# Patient Record
Sex: FEMALE | Race: WHITE
[De-identification: names, ages, dates, MRNs, and addresses within clinical notes are randomized per-mention and may not be internally consistent; named-entity substitution may affect disease eponyms.]

---

## 2019-04-01 ENCOUNTER — HOSPITAL ENCOUNTER (INPATIENT)
Dept: HOSPITAL 19 - LDRO | Age: 29
LOS: 2 days | Discharge: HOME | End: 2019-04-03
Payer: COMMERCIAL

## 2019-04-01 VITALS — DIASTOLIC BLOOD PRESSURE: 77 MMHG | HEART RATE: 83 BPM | SYSTOLIC BLOOD PRESSURE: 146 MMHG

## 2019-04-01 VITALS — HEART RATE: 105 BPM | SYSTOLIC BLOOD PRESSURE: 133 MMHG | DIASTOLIC BLOOD PRESSURE: 76 MMHG

## 2019-04-01 VITALS — SYSTOLIC BLOOD PRESSURE: 146 MMHG | DIASTOLIC BLOOD PRESSURE: 96 MMHG | HEART RATE: 88 BPM

## 2019-04-01 VITALS — SYSTOLIC BLOOD PRESSURE: 142 MMHG | DIASTOLIC BLOOD PRESSURE: 92 MMHG | HEART RATE: 86 BPM

## 2019-04-01 VITALS — HEART RATE: 97 BPM | SYSTOLIC BLOOD PRESSURE: 136 MMHG | DIASTOLIC BLOOD PRESSURE: 84 MMHG

## 2019-04-01 VITALS — TEMPERATURE: 98 F | DIASTOLIC BLOOD PRESSURE: 88 MMHG | HEART RATE: 114 BPM | SYSTOLIC BLOOD PRESSURE: 174 MMHG

## 2019-04-01 VITALS — HEART RATE: 80 BPM | DIASTOLIC BLOOD PRESSURE: 85 MMHG | SYSTOLIC BLOOD PRESSURE: 142 MMHG

## 2019-04-01 VITALS — TEMPERATURE: 98.4 F | SYSTOLIC BLOOD PRESSURE: 159 MMHG | DIASTOLIC BLOOD PRESSURE: 104 MMHG | HEART RATE: 93 BPM

## 2019-04-01 VITALS — DIASTOLIC BLOOD PRESSURE: 75 MMHG | HEART RATE: 83 BPM | SYSTOLIC BLOOD PRESSURE: 145 MMHG

## 2019-04-01 VITALS — SYSTOLIC BLOOD PRESSURE: 137 MMHG | DIASTOLIC BLOOD PRESSURE: 80 MMHG | HEART RATE: 93 BPM

## 2019-04-01 VITALS — HEART RATE: 123 BPM | SYSTOLIC BLOOD PRESSURE: 137 MMHG | DIASTOLIC BLOOD PRESSURE: 68 MMHG

## 2019-04-01 VITALS — SYSTOLIC BLOOD PRESSURE: 139 MMHG | HEART RATE: 91 BPM | DIASTOLIC BLOOD PRESSURE: 89 MMHG

## 2019-04-01 VITALS — HEART RATE: 83 BPM | DIASTOLIC BLOOD PRESSURE: 86 MMHG | TEMPERATURE: 97.6 F | SYSTOLIC BLOOD PRESSURE: 141 MMHG

## 2019-04-01 VITALS — SYSTOLIC BLOOD PRESSURE: 152 MMHG | HEART RATE: 87 BPM | DIASTOLIC BLOOD PRESSURE: 85 MMHG

## 2019-04-01 VITALS — SYSTOLIC BLOOD PRESSURE: 133 MMHG | HEART RATE: 89 BPM | DIASTOLIC BLOOD PRESSURE: 85 MMHG

## 2019-04-01 VITALS — SYSTOLIC BLOOD PRESSURE: 139 MMHG | HEART RATE: 102 BPM | DIASTOLIC BLOOD PRESSURE: 89 MMHG

## 2019-04-01 VITALS — HEIGHT: 65 IN | WEIGHT: 170.42 LBS | BODY MASS INDEX: 28.39 KG/M2

## 2019-04-01 VITALS — SYSTOLIC BLOOD PRESSURE: 140 MMHG | HEART RATE: 99 BPM | DIASTOLIC BLOOD PRESSURE: 90 MMHG

## 2019-04-01 VITALS — HEART RATE: 95 BPM | DIASTOLIC BLOOD PRESSURE: 90 MMHG | SYSTOLIC BLOOD PRESSURE: 146 MMHG

## 2019-04-01 VITALS — HEART RATE: 77 BPM | DIASTOLIC BLOOD PRESSURE: 88 MMHG | SYSTOLIC BLOOD PRESSURE: 151 MMHG

## 2019-04-01 DIAGNOSIS — O14.93: Primary | ICD-10-CM

## 2019-04-01 DIAGNOSIS — Z3A.36: ICD-10-CM

## 2019-04-01 DIAGNOSIS — D62: ICD-10-CM

## 2019-04-01 DIAGNOSIS — O43.193: ICD-10-CM

## 2019-04-01 DIAGNOSIS — O99.343: ICD-10-CM

## 2019-04-01 DIAGNOSIS — F41.9: ICD-10-CM

## 2019-04-01 LAB
ALBUMIN SERPL-MCNC: 3.3 GM/DL (ref 3.5–5)
ALP SERPL-CCNC: 243 U/L (ref 50–136)
ALT SERPL-CCNC: 56 U/L (ref 9–52)
ANION GAP SERPL CALC-SCNC: 8 MMOL/L (ref 7–16)
AST SERPL-CCNC: 63 U/L (ref 15–37)
BASOPHILS # BLD: 0 10*3/UL (ref 0–0.2)
BASOPHILS NFR BLD AUTO: 0.4 % (ref 0–2)
BILIRUB SERPL-MCNC: 0.3 MG/DL (ref 0–1)
BUN SERPL-MCNC: 16 MG/DL (ref 7–17)
CALCIUM SERPL-MCNC: 9.1 MG/DL (ref 8.4–10.2)
CHLORIDE SERPL-SCNC: 105 MMOL/L (ref 98–107)
CO2 SERPL-SCNC: 20 MMOL/L (ref 22–30)
CREAT SERPL-SCNC: 0.9 UMOL/L (ref 0.52–1.25)
EOSINOPHIL # BLD: 0 10*3/UL (ref 0–0.7)
EOSINOPHIL NFR BLD: 0.4 % (ref 0–4)
ERYTHROCYTE [DISTWIDTH] IN BLOOD BY AUTOMATED COUNT: 12.4 % (ref 11.5–14.5)
GLUCOSE SERPL-MCNC: 83 MG/DL (ref 74–106)
GRANULOCYTES # BLD AUTO: 71 % (ref 42.2–75.2)
HCT VFR BLD AUTO: 33.1 % (ref 37–47)
HGB BLD-MCNC: 11.4 G/DL (ref 12.5–16)
LYMPHOCYTES # BLD: 1.7 10*3/UL (ref 1.2–3.4)
LYMPHOCYTES NFR BLD: 21.1 % (ref 20–51)
MCH RBC QN AUTO: 30 PG (ref 27–31)
MCHC RBC AUTO-ENTMCNC: 34 G/DL (ref 33–37)
MCV RBC AUTO: 86 FL (ref 80–100)
MONOCYTES # BLD: 0.5 10*3/UL (ref 0.1–0.6)
MONOCYTES NFR BLD AUTO: 6.1 % (ref 1.7–9.3)
NEUTROPHILS # BLD: 5.6 10*3/UL (ref 1.4–6.5)
PH UR STRIP.AUTO: 6 [PH] (ref 5–8)
PLATELET # BLD AUTO: 255 K/MM3 (ref 130–400)
PMV BLD AUTO: 11.7 FL (ref 7.4–10.4)
POTASSIUM SERPL-SCNC: 4 MMOL/L (ref 3.4–5)
PROT SERPL-MCNC: 6.5 GM/DL (ref 6.4–8.2)
RBC # BLD AUTO: 3.87 M/MM3 (ref 4.1–5.3)
RBC # UR: (no result) /HPF
SODIUM SERPL-SCNC: 132 MMOL/L (ref 137–145)
SP GR UR STRIP.AUTO: 1.01 (ref 1–1.03)
SQUAMOUS # URNS: (no result) /HPF
UA DIPSTICK PNL UR STRIP.AUTO: (no result)
URN COLLECT METHOD CLASS: (no result)
WBC # UR: (no result) /HPF

## 2019-04-01 PROCEDURE — 0KQM0ZZ REPAIR PERINEUM MUSCLE, OPEN APPROACH: ICD-10-PCS | Performed by: OBSTETRICS & GYNECOLOGY

## 2019-04-01 NOTE — NUR
Pt able to hold legs up bilaterally. Pt assisted to edge of bed. Epidural
catheter removed without difficulties. Pt denies lightheadedness or dizziness.
Pt ambulatory to bathroom, one person assist. Pt unable to void. Educated pt
to drink plenty of fluids and will try again to void in one hour. Pericare
provided. Mesh panties, pad, ice pack and tucks applied. Pt ambulatory to room
215. Pt and  oriented to postpartum room. Call light within reach. Will
continue to monitor.

## 2019-04-01 NOTE — NUR
Patient ambulatory to LR 4, changed into gown, FHR/TOCO monitors placed and
explained. Patient states her water broke around 1300 and has been fidel
since about 0830.
SVE-Per Tiffanie RN-4/90/0 and clear fluid noted.
Assessment completed and consents gone over and signed.
1400: IV placed in left forearm per Tiffanie RN, labs drawn and to lab, and
LR infusing. Patient requesting epidural.
Rachel HORTON at nurses station and notified.
1415: FHR tracing maternal heart rate due to maternal position-sitting
forward.
1420: FHR continues to trace maternal heart rate.
1428: Patient sits up on edge of bed for epidural placement. Rachel CRNA at
bedside for procedure.
1445: Test dose and patient tolerates well.
1452: Patient repositioned and becoming comfortable with epidural.
1525: Patient comfortable with epidural and dukes catheter placed/patient
tolerates well. SVE-8-9/100/0 and Dr. Parker called and notifed.
Patient updated on plan of care and will continue to monitor.

## 2019-04-01 NOTE — NUR
Dr. Parker at bedside and SVE per physician 10/+1.
Orders to set up to start pushing.
1650: Pisano catheter removed and patient tolerates well.
1653: Patient begins to push with Dr. Parker with each contraction.
1712: Bed taken apart and patient set up for vaginal delivery.
1716: Spontaneous vaginal delivery of head followed by body, Infant to mothers
abdomen, Sylvia RN assumes care of infant. Cord clamped by Dr. Parker and
cut by FOB. Cord blood obtained.
1620: Spontaneous delivery of placenta and accessory lobe and pitocin bolus
started
per protocol.
Fundal massage done, bleeding WNL and firm
Dr. Parker begins to repair laceration and red robins patient at this time.
1730: Patient repositioned, Ice pack to tiarra, and fundal massage done-bleeding
moderate with apple size blood clot expressed. Dr. Parker notifed and observes
bleeding at this time, no new orders.
Plan of care discussed and will continue to monitor.

## 2019-04-02 VITALS — SYSTOLIC BLOOD PRESSURE: 138 MMHG | HEART RATE: 87 BPM | DIASTOLIC BLOOD PRESSURE: 89 MMHG | TEMPERATURE: 98.3 F

## 2019-04-02 VITALS — TEMPERATURE: 98.5 F | HEART RATE: 78 BPM | SYSTOLIC BLOOD PRESSURE: 154 MMHG | DIASTOLIC BLOOD PRESSURE: 97 MMHG

## 2019-04-02 VITALS — SYSTOLIC BLOOD PRESSURE: 136 MMHG | HEART RATE: 83 BPM | DIASTOLIC BLOOD PRESSURE: 91 MMHG

## 2019-04-02 VITALS — HEART RATE: 84 BPM | DIASTOLIC BLOOD PRESSURE: 81 MMHG | SYSTOLIC BLOOD PRESSURE: 157 MMHG | TEMPERATURE: 97.7 F

## 2019-04-02 VITALS — HEART RATE: 98 BPM | DIASTOLIC BLOOD PRESSURE: 97 MMHG | TEMPERATURE: 98.7 F | SYSTOLIC BLOOD PRESSURE: 145 MMHG

## 2019-04-02 VITALS — SYSTOLIC BLOOD PRESSURE: 146 MMHG | DIASTOLIC BLOOD PRESSURE: 89 MMHG | HEART RATE: 89 BPM

## 2019-04-02 VITALS — SYSTOLIC BLOOD PRESSURE: 148 MMHG | HEART RATE: 84 BPM | DIASTOLIC BLOOD PRESSURE: 92 MMHG

## 2019-04-02 VITALS — DIASTOLIC BLOOD PRESSURE: 84 MMHG | HEART RATE: 83 BPM | TEMPERATURE: 98.2 F | SYSTOLIC BLOOD PRESSURE: 134 MMHG

## 2019-04-02 NOTE — NUR
0050- DR GRESHAM AT [facility] TO SEE LABOR PT AND IS UPDATED ON PT'S MOST RECENT
BLOOD PRESSURE AND PLAN TO RECHECK AT 0200. ORDER FOR PROCARDIA XL 30 MG PO
DAILY TO START TONIGHT IF BLOOD PRESSURE IS >140 OR >90 DIASTOLIC.
0215- DR GRESHAM UPDATED ON CURRENT BLOOD PRESSURE. STATES TO START PROCARDIA AS
ORDERED.
0220- PT UPDATED ON PLAN OF CARE FOR NEW MEDICATION. QUESTIONS ANSWERED.

## 2019-04-03 VITALS — SYSTOLIC BLOOD PRESSURE: 138 MMHG | DIASTOLIC BLOOD PRESSURE: 78 MMHG | HEART RATE: 72 BPM | TEMPERATURE: 98.1 F

## 2019-04-03 LAB
ALBUMIN SERPL-MCNC: 2.9 GM/DL (ref 3.5–5)
ALP SERPL-CCNC: 183 U/L (ref 50–136)
ALT SERPL-CCNC: 55 U/L (ref 9–52)
ANION GAP SERPL CALC-SCNC: 6 MMOL/L (ref 7–16)
AST SERPL-CCNC: 57 U/L (ref 15–37)
BILIRUB SERPL-MCNC: 0.1 MG/DL (ref 0–1)
BUN SERPL-MCNC: 13 MG/DL (ref 7–17)
CALCIUM SERPL-MCNC: 8.2 MG/DL (ref 8.4–10.2)
CHLORIDE SERPL-SCNC: 106 MMOL/L (ref 98–107)
CO2 SERPL-SCNC: 23 MMOL/L (ref 22–30)
CREAT SERPL-SCNC: 0.83 UMOL/L (ref 0.52–1.25)
ERYTHROCYTE [DISTWIDTH] IN BLOOD BY AUTOMATED COUNT: 13.2 % (ref 11.5–14.5)
GLUCOSE SERPL-MCNC: 78 MG/DL (ref 74–106)
HCT VFR BLD AUTO: 26.8 % (ref 37–47)
HGB BLD-MCNC: 8.8 G/DL (ref 12.5–16)
MCH RBC QN AUTO: 29 PG (ref 27–31)
MCHC RBC AUTO-ENTMCNC: 33 G/DL (ref 33–37)
MCV RBC AUTO: 88 FL (ref 80–100)
PLATELET # BLD AUTO: 250 K/MM3 (ref 130–400)
PMV BLD AUTO: 10.6 FL (ref 7.4–10.4)
POTASSIUM SERPL-SCNC: 4.2 MMOL/L (ref 3.4–5)
PROT SERPL-MCNC: 5.7 GM/DL (ref 6.4–8.2)
RBC # BLD AUTO: 3.05 M/MM3 (ref 4.1–5.3)
SODIUM SERPL-SCNC: 135 MMOL/L (ref 137–145)

## 2019-04-03 NOTE — NUR
Initial visit attempt; Family resting,  left card of congratulations
for the birth of their son and information regarding the availability of
spiritual care at our hospital.

## 2020-11-08 ENCOUNTER — HOSPITAL ENCOUNTER (INPATIENT)
Dept: HOSPITAL 19 - LDRO | Age: 30
LOS: 2 days | Discharge: HOME | End: 2020-11-10
Payer: COMMERCIAL

## 2020-11-08 VITALS — SYSTOLIC BLOOD PRESSURE: 162 MMHG | DIASTOLIC BLOOD PRESSURE: 71 MMHG | HEART RATE: 80 BPM

## 2020-11-08 VITALS — SYSTOLIC BLOOD PRESSURE: 122 MMHG | DIASTOLIC BLOOD PRESSURE: 62 MMHG | HEART RATE: 90 BPM

## 2020-11-08 VITALS — DIASTOLIC BLOOD PRESSURE: 77 MMHG | SYSTOLIC BLOOD PRESSURE: 128 MMHG | HEART RATE: 93 BPM | TEMPERATURE: 98 F

## 2020-11-08 VITALS — HEART RATE: 76 BPM | SYSTOLIC BLOOD PRESSURE: 108 MMHG | DIASTOLIC BLOOD PRESSURE: 73 MMHG

## 2020-11-08 VITALS — HEART RATE: 68 BPM | SYSTOLIC BLOOD PRESSURE: 123 MMHG | DIASTOLIC BLOOD PRESSURE: 81 MMHG | TEMPERATURE: 98 F

## 2020-11-08 VITALS — DIASTOLIC BLOOD PRESSURE: 59 MMHG | HEART RATE: 70 BPM | SYSTOLIC BLOOD PRESSURE: 116 MMHG

## 2020-11-08 VITALS — SYSTOLIC BLOOD PRESSURE: 112 MMHG | DIASTOLIC BLOOD PRESSURE: 64 MMHG | HEART RATE: 95 BPM

## 2020-11-08 VITALS — HEIGHT: 65 IN | WEIGHT: 174.39 LBS | BODY MASS INDEX: 29.05 KG/M2

## 2020-11-08 VITALS — HEART RATE: 83 BPM | SYSTOLIC BLOOD PRESSURE: 131 MMHG | DIASTOLIC BLOOD PRESSURE: 81 MMHG

## 2020-11-08 VITALS — DIASTOLIC BLOOD PRESSURE: 74 MMHG | HEART RATE: 86 BPM | SYSTOLIC BLOOD PRESSURE: 139 MMHG

## 2020-11-08 VITALS — SYSTOLIC BLOOD PRESSURE: 128 MMHG | HEART RATE: 82 BPM | DIASTOLIC BLOOD PRESSURE: 80 MMHG | TEMPERATURE: 98.2 F

## 2020-11-08 VITALS — SYSTOLIC BLOOD PRESSURE: 120 MMHG | HEART RATE: 88 BPM | DIASTOLIC BLOOD PRESSURE: 67 MMHG

## 2020-11-08 VITALS — DIASTOLIC BLOOD PRESSURE: 66 MMHG | HEART RATE: 78 BPM | SYSTOLIC BLOOD PRESSURE: 116 MMHG

## 2020-11-08 VITALS — HEART RATE: 68 BPM | SYSTOLIC BLOOD PRESSURE: 106 MMHG | DIASTOLIC BLOOD PRESSURE: 62 MMHG

## 2020-11-08 VITALS — SYSTOLIC BLOOD PRESSURE: 126 MMHG | DIASTOLIC BLOOD PRESSURE: 69 MMHG | HEART RATE: 80 BPM

## 2020-11-08 VITALS — HEART RATE: 82 BPM | SYSTOLIC BLOOD PRESSURE: 117 MMHG | DIASTOLIC BLOOD PRESSURE: 61 MMHG

## 2020-11-08 VITALS — DIASTOLIC BLOOD PRESSURE: 63 MMHG | HEART RATE: 76 BPM | SYSTOLIC BLOOD PRESSURE: 126 MMHG

## 2020-11-08 VITALS — HEART RATE: 80 BPM | DIASTOLIC BLOOD PRESSURE: 62 MMHG | SYSTOLIC BLOOD PRESSURE: 118 MMHG

## 2020-11-08 VITALS — HEART RATE: 90 BPM | DIASTOLIC BLOOD PRESSURE: 67 MMHG | SYSTOLIC BLOOD PRESSURE: 136 MMHG

## 2020-11-08 VITALS — HEART RATE: 93 BPM | SYSTOLIC BLOOD PRESSURE: 146 MMHG | DIASTOLIC BLOOD PRESSURE: 76 MMHG

## 2020-11-08 VITALS — TEMPERATURE: 98.2 F | SYSTOLIC BLOOD PRESSURE: 144 MMHG | HEART RATE: 104 BPM | DIASTOLIC BLOOD PRESSURE: 88 MMHG

## 2020-11-08 VITALS — DIASTOLIC BLOOD PRESSURE: 64 MMHG | HEART RATE: 93 BPM | SYSTOLIC BLOOD PRESSURE: 108 MMHG

## 2020-11-08 VITALS — DIASTOLIC BLOOD PRESSURE: 62 MMHG | SYSTOLIC BLOOD PRESSURE: 109 MMHG | HEART RATE: 78 BPM

## 2020-11-08 DIAGNOSIS — F41.9: ICD-10-CM

## 2020-11-08 DIAGNOSIS — Z3A.39: ICD-10-CM

## 2020-11-08 LAB
BASOPHILS # BLD: 0 10*3/UL (ref 0–0.2)
BASOPHILS NFR BLD AUTO: 0.4 % (ref 0–2)
EOSINOPHIL # BLD: 0.1 10*3/UL (ref 0–0.7)
EOSINOPHIL NFR BLD: 0.9 % (ref 0–4)
ERYTHROCYTE [DISTWIDTH] IN BLOOD BY AUTOMATED COUNT: 12.5 % (ref 11.5–14.5)
GRANULOCYTES # BLD AUTO: 65 % (ref 42.2–75.2)
HCT VFR BLD AUTO: 37.3 % (ref 37–47)
HGB BLD-MCNC: 13 G/DL (ref 12.5–16)
LYMPHOCYTES # BLD: 2.3 10*3/UL (ref 1.2–3.4)
LYMPHOCYTES NFR BLD: 24.7 % (ref 20–51)
MCH RBC QN AUTO: 31 PG (ref 27–31)
MCHC RBC AUTO-ENTMCNC: 35 G/DL (ref 33–37)
MCV RBC AUTO: 88 FL (ref 80–100)
MONOCYTES # BLD: 0.7 10*3/UL (ref 0.1–0.6)
MONOCYTES NFR BLD AUTO: 8 % (ref 1.7–9.3)
NEUTROPHILS # BLD: 6 10*3/UL (ref 1.4–6.5)
PLATELET # BLD AUTO: 234 K/MM3 (ref 130–400)
PMV BLD AUTO: 11.2 FL (ref 7.4–10.4)
RBC # BLD AUTO: 4.24 M/MM3 (ref 4.1–5.3)

## 2020-11-08 PROCEDURE — 0KQM0ZZ REPAIR PERINEUM MUSCLE, OPEN APPROACH: ICD-10-PCS | Performed by: OBSTETRICS & GYNECOLOGY

## 2020-11-08 NOTE — NUR
1025- Dr Mayo at bedside. SVE, complete. Pt and room prepped for delivery. Dr Mayo remains at bedside. Elizabeth, nursery RN to bedside.
 
1033- Pt begins pushing with UC.
 
1034-  of viable female inant. Intant to mother's chest, tended to by
nursery.
 
1036- Cord clamped and cut. Cord blood obtained.
 
1046- Spontaneous delivery of placenta. Pitocin started at 333ml/hr. Fundus
massaged to firm by MD. Repair completed. Pericare completed. Ice pack to
perieum. Pt repositioned to high fowlers. Pt in stable condition.

## 2020-11-08 NOTE — NUR
0805- Pt arrives on unit via wheelchair with complaints of UCs since 0630 this
morning. Pt denies VB, LOF. Pt into bathroom to change into gown.
 
0808- Pt into bed, EFM and TOCO on and tracing. O2 sat on and tracing maternal
HR. SVE 6-7/100/+1, intact. Pt very uncomfortable with UCs.
 
0830- IV start without difficutly, labs obtained.

## 2020-11-08 NOTE — NUR
0857- Alfonso CRNA at bedside for epidural placement. Pt assisted to sitting
on side of bed. FHR not tracing well due to maternal position, O2 sat monitor
on and tracing maternal HR.
 
0902- Single shot, see anesthesia record.
 
0905- Pt assisted to semi-fowlers with WL. EFM and TOCO tracing. Pt tolerated
well.

## 2020-11-08 NOTE — NUR
1315- Pt ambulated to bathroom independently with RN standby assist. Unable to
void. Pt states she has no urge to push and feels like butt is still numb.
Pericare compeleted and explained. Pt encouraged to drink water and try to
void in an hour or less. Pt verbalizes understanding.  Pt, , and baby
to PP room. Oriented, Call light within reach.

## 2020-11-09 VITALS — SYSTOLIC BLOOD PRESSURE: 118 MMHG | HEART RATE: 73 BPM | TEMPERATURE: 98.1 F | DIASTOLIC BLOOD PRESSURE: 73 MMHG

## 2020-11-09 VITALS — DIASTOLIC BLOOD PRESSURE: 75 MMHG | TEMPERATURE: 98.2 F | SYSTOLIC BLOOD PRESSURE: 120 MMHG | HEART RATE: 82 BPM

## 2020-11-09 VITALS — DIASTOLIC BLOOD PRESSURE: 74 MMHG | SYSTOLIC BLOOD PRESSURE: 128 MMHG | TEMPERATURE: 98 F | HEART RATE: 79 BPM

## 2020-11-09 VITALS — SYSTOLIC BLOOD PRESSURE: 116 MMHG | HEART RATE: 73 BPM | DIASTOLIC BLOOD PRESSURE: 71 MMHG | TEMPERATURE: 97.9 F

## 2020-11-09 NOTE — NUR
Initial visit; Parents thanked  for offering congratulations and God's
blessings to their family for the birth of their daughter.  thanked
them for choosing [facility].

## 2020-11-10 VITALS — DIASTOLIC BLOOD PRESSURE: 77 MMHG | TEMPERATURE: 98.3 F | SYSTOLIC BLOOD PRESSURE: 110 MMHG | HEART RATE: 83 BPM

## 2020-11-10 VITALS — SYSTOLIC BLOOD PRESSURE: 124 MMHG | DIASTOLIC BLOOD PRESSURE: 71 MMHG | HEART RATE: 73 BPM | TEMPERATURE: 98.2 F

## 2023-05-12 ENCOUNTER — HOSPITAL ENCOUNTER (INPATIENT)
Dept: HOSPITAL 19 - LDR | Age: 33
LOS: 1 days | Discharge: HOME | End: 2023-05-13
Payer: COMMERCIAL

## 2023-05-12 VITALS — SYSTOLIC BLOOD PRESSURE: 138 MMHG | HEART RATE: 83 BPM | DIASTOLIC BLOOD PRESSURE: 81 MMHG

## 2023-05-12 VITALS — DIASTOLIC BLOOD PRESSURE: 75 MMHG | SYSTOLIC BLOOD PRESSURE: 144 MMHG | HEART RATE: 102 BPM

## 2023-05-12 VITALS — HEART RATE: 91 BPM | DIASTOLIC BLOOD PRESSURE: 62 MMHG | SYSTOLIC BLOOD PRESSURE: 139 MMHG

## 2023-05-12 VITALS — SYSTOLIC BLOOD PRESSURE: 133 MMHG | HEART RATE: 91 BPM | DIASTOLIC BLOOD PRESSURE: 81 MMHG

## 2023-05-12 VITALS — HEART RATE: 88 BPM | SYSTOLIC BLOOD PRESSURE: 148 MMHG | DIASTOLIC BLOOD PRESSURE: 91 MMHG

## 2023-05-12 VITALS — HEART RATE: 87 BPM | SYSTOLIC BLOOD PRESSURE: 123 MMHG | DIASTOLIC BLOOD PRESSURE: 74 MMHG

## 2023-05-12 VITALS — HEART RATE: 83 BPM | DIASTOLIC BLOOD PRESSURE: 80 MMHG | SYSTOLIC BLOOD PRESSURE: 128 MMHG

## 2023-05-12 VITALS — BODY MASS INDEX: 29.38 KG/M2 | HEIGHT: 65 IN | WEIGHT: 176.37 LBS

## 2023-05-12 VITALS — HEART RATE: 82 BPM | DIASTOLIC BLOOD PRESSURE: 66 MMHG | SYSTOLIC BLOOD PRESSURE: 124 MMHG

## 2023-05-12 VITALS — SYSTOLIC BLOOD PRESSURE: 121 MMHG | DIASTOLIC BLOOD PRESSURE: 73 MMHG | HEART RATE: 90 BPM | TEMPERATURE: 97.9 F

## 2023-05-12 VITALS — SYSTOLIC BLOOD PRESSURE: 132 MMHG | HEART RATE: 81 BPM | DIASTOLIC BLOOD PRESSURE: 66 MMHG

## 2023-05-12 VITALS — HEART RATE: 87 BPM | SYSTOLIC BLOOD PRESSURE: 137 MMHG | DIASTOLIC BLOOD PRESSURE: 77 MMHG

## 2023-05-12 VITALS — DIASTOLIC BLOOD PRESSURE: 76 MMHG | HEART RATE: 83 BPM | SYSTOLIC BLOOD PRESSURE: 132 MMHG

## 2023-05-12 VITALS — HEART RATE: 87 BPM | DIASTOLIC BLOOD PRESSURE: 79 MMHG | SYSTOLIC BLOOD PRESSURE: 140 MMHG

## 2023-05-12 VITALS — HEART RATE: 88 BPM | DIASTOLIC BLOOD PRESSURE: 82 MMHG | SYSTOLIC BLOOD PRESSURE: 134 MMHG | TEMPERATURE: 98.1 F

## 2023-05-12 VITALS — DIASTOLIC BLOOD PRESSURE: 65 MMHG | TEMPERATURE: 97.4 F | SYSTOLIC BLOOD PRESSURE: 138 MMHG | HEART RATE: 83 BPM

## 2023-05-12 VITALS — HEART RATE: 88 BPM | SYSTOLIC BLOOD PRESSURE: 140 MMHG | DIASTOLIC BLOOD PRESSURE: 83 MMHG

## 2023-05-12 VITALS — SYSTOLIC BLOOD PRESSURE: 169 MMHG | HEART RATE: 88 BPM | DIASTOLIC BLOOD PRESSURE: 70 MMHG

## 2023-05-12 VITALS — DIASTOLIC BLOOD PRESSURE: 80 MMHG | HEART RATE: 84 BPM | SYSTOLIC BLOOD PRESSURE: 129 MMHG

## 2023-05-12 VITALS — DIASTOLIC BLOOD PRESSURE: 86 MMHG | HEART RATE: 93 BPM | SYSTOLIC BLOOD PRESSURE: 149 MMHG

## 2023-05-12 VITALS — HEART RATE: 82 BPM | DIASTOLIC BLOOD PRESSURE: 70 MMHG | SYSTOLIC BLOOD PRESSURE: 141 MMHG

## 2023-05-12 VITALS — HEART RATE: 82 BPM | SYSTOLIC BLOOD PRESSURE: 124 MMHG | DIASTOLIC BLOOD PRESSURE: 69 MMHG

## 2023-05-12 VITALS — SYSTOLIC BLOOD PRESSURE: 129 MMHG | HEART RATE: 82 BPM | DIASTOLIC BLOOD PRESSURE: 72 MMHG

## 2023-05-12 DIAGNOSIS — Z3A.38: ICD-10-CM

## 2023-05-12 DIAGNOSIS — F41.9: ICD-10-CM

## 2023-05-12 LAB
BASOPHILS # BLD: 0.1 K/MM3 (ref 0–0.2)
BASOPHILS NFR BLD AUTO: 0.7 % (ref 0–2)
EOSINOPHIL # BLD: 0.1 K/MM3 (ref 0–0.7)
EOSINOPHIL NFR BLD: 0.8 % (ref 0–4)
ERYTHROCYTE [DISTWIDTH] IN BLOOD BY AUTOMATED COUNT: 12.2 % (ref 11.5–14.5)
GRANULOCYTES # BLD AUTO: 70.1 % (ref 42.2–75.2)
HCT VFR BLD AUTO: 36.2 % (ref 37–47)
HGB BLD-MCNC: 13 G/DL (ref 12.5–16)
LYMPHOCYTES # BLD: 1.5 K/MM3 (ref 1.2–3.4)
LYMPHOCYTES NFR BLD: 20.5 % (ref 20–51)
MCH RBC QN AUTO: 32 PG (ref 27–31)
MCHC RBC AUTO-ENTMCNC: 36 G/DL (ref 33–37)
MCV RBC AUTO: 89 FL (ref 80–100)
MONOCYTES # BLD: 0.5 K/MM3 (ref 0.1–0.6)
MONOCYTES NFR BLD AUTO: 7 % (ref 1.7–9.3)
NEUTROPHILS # BLD: 5.3 K/MM3 (ref 1.4–6.5)
PLATELET # BLD AUTO: 258 K/MM3 (ref 130–400)
PMV BLD AUTO: 11.3 FL (ref 7.4–10.4)
RBC # BLD AUTO: 4.07 M/MM3 (ref 4.1–5.3)

## 2023-05-12 PROCEDURE — 10D17Z9 MANUAL EXTRACTION OF PRODUCTS OF CONCEPTION, RETAINED, VIA NATURAL OR ARTIFICIAL OPENING: ICD-10-PCS | Performed by: OBSTETRICS & GYNECOLOGY

## 2023-05-12 PROCEDURE — 3E033VJ INTRODUCTION OF OTHER HORMONE INTO PERIPHERAL VEIN, PERCUTANEOUS APPROACH: ICD-10-PCS | Performed by: OBSTETRICS & GYNECOLOGY

## 2023-05-12 PROCEDURE — 0KQM0ZZ REPAIR PERINEUM MUSCLE, OPEN APPROACH: ICD-10-PCS | Performed by: OBSTETRICS & GYNECOLOGY

## 2023-05-12 NOTE — NUR
Pt able to ambulate to bathroom independently. Pt able to void 100 mL blood
tinged urine. Pericare explained and provided. Mesh panties, peripad, and ice
pack applied. Clean gown on. Pt transferred to room 208 in stable condition
with belongings.

## 2023-05-12 NOTE — NUR
1645 SVE 8/90/+1.
 
1651 DR GRESHAM CALLED FOR DELIVERY
 
1700 DR GRESHAM IN ROOM ASSESSING PT.
 
1710 SVE 10/100/+1.
 
1712 SPONTANEOUS VAGINAL DELIVERY OF VIABLE MALE INFANT. INFANT BULB SUCTIONED
AND STIMULATED. INFANT ON MOTHER'S ABDOMEN. CORD CLAMPED BY DR GRESHAM AND CUT
BY FOB. SAUL YOO RN TAKES OVER CARE OF INFANT.
 
PLACENTA NOT DELIVERY SPONTANEOUSLY. DR GRESHAM VERBALLY ORDERS FENATYNL FOR
PAIN CONTROL FOR MANUAL EXTRACTION OF PLACENTA.
 
1737 FENATYNL GIVEN.
 
1745 MANUAL EXTRACTION OF PLACENTA. FUNDAL MASSAGE DONE. PITOCIN BOLUS
STARTED PER PROTOCOL. PT REPOSITIONED. PERICARE DONE. CLEAN PAD PLACED. WARM
BLANKET GIVEN. PLAN OF CARE DISCUSSED. PT VERBALIZES UNDERSTANDING.

## 2023-05-12 NOTE — NUR
DR GOODPASTURE IN ROOM DISCUSSING PLAN OF CARE WITH PT AND THAT SHE WILL ONLY
BE AVAILABLE TILL 1445. PT VERBALIZES UNDERSTANDING. DR GRESHAM TAKING OVER FOR
DR GOODPASTURE AT 1445.

## 2023-05-12 NOTE — NUR
PT AMBULATORY TO LR4. SPOUSE IN ADMISSIONS STILL. PT IS SENT OVER FROM
OFFICE/CLINIC FOR POSITIVE NITRAZINE. DR GOODPASTURE SENDS OVER FOR INDUCTION
DUE TO MOST LIKELY A HIGH LEAK FOR THE LAST COUPLE DAYS. PT WEARING OWN LABOR
GOWN. FHR MONITOR/TOCO APPLIED. PT DENIES VAGINAL BLEEDING, REGULAR
CONTRACTIONS OR DECREASED FETAL MOVEMENT.
 
SVE DONE IN OFFICE RIGHT BEFORE PT WAS SENT OVER FOR INDUCTION. SVE WAS
4/50/-2.

## 2023-05-12 NOTE — NUR
[id number] THIS RN IN ROOM ADJUSTING FHR MONITOR. FETAL MOVEMENT AUDIBLE. PT
STILL ON BIRTHING BALL. SECOND STRAP APPLIED TO FHR MONITOR.

## 2023-05-13 VITALS — DIASTOLIC BLOOD PRESSURE: 73 MMHG | SYSTOLIC BLOOD PRESSURE: 129 MMHG | TEMPERATURE: 97.5 F | HEART RATE: 87 BPM

## 2023-05-13 VITALS — TEMPERATURE: 98.1 F | DIASTOLIC BLOOD PRESSURE: 81 MMHG | SYSTOLIC BLOOD PRESSURE: 129 MMHG | HEART RATE: 80 BPM

## 2023-05-13 VITALS — HEART RATE: 84 BPM | TEMPERATURE: 98.4 F | SYSTOLIC BLOOD PRESSURE: 127 MMHG | DIASTOLIC BLOOD PRESSURE: 83 MMHG
